# Patient Record
Sex: MALE | Race: OTHER
[De-identification: names, ages, dates, MRNs, and addresses within clinical notes are randomized per-mention and may not be internally consistent; named-entity substitution may affect disease eponyms.]

---

## 2023-02-09 ENCOUNTER — HOSPITAL ENCOUNTER (INPATIENT)
Dept: HOSPITAL 93 - ER | Age: 88
LOS: 3 days | Discharge: HOME | DRG: 683 | End: 2023-02-12
Attending: INTERNAL MEDICINE | Admitting: INTERNAL MEDICINE
Payer: COMMERCIAL

## 2023-02-09 VITALS — HEIGHT: 60 IN | WEIGHT: 122 LBS | BODY MASS INDEX: 23.95 KG/M2

## 2023-02-09 DIAGNOSIS — N18.9: ICD-10-CM

## 2023-02-09 DIAGNOSIS — E11.65: ICD-10-CM

## 2023-02-09 DIAGNOSIS — N17.8: Primary | ICD-10-CM

## 2023-02-09 DIAGNOSIS — Z20.822: ICD-10-CM

## 2023-02-09 DIAGNOSIS — I25.10: ICD-10-CM

## 2023-02-09 DIAGNOSIS — Z79.4: ICD-10-CM

## 2023-02-09 DIAGNOSIS — I13.0: ICD-10-CM

## 2023-02-09 DIAGNOSIS — E87.20: ICD-10-CM

## 2023-02-09 DIAGNOSIS — E78.5: ICD-10-CM

## 2023-02-09 DIAGNOSIS — I50.9: ICD-10-CM

## 2023-02-09 DIAGNOSIS — K21.9: ICD-10-CM

## 2023-02-09 NOTE — NUR
SE RECIBE PACIENTE EN OTIS ALERTA Y ORIENTADO X3 .PTE CON VENOPUNCION
PATENTE Y KATHERIN DE EDEMA BAJANDO 0.9NSS @150ML. PTE PENDIENTE MICHAEL DXT.

## 2023-02-09 NOTE — NUR
SE LE ORIENTA A PACIENTE SOBRE LAS ORDENES MEDICAS, REFIERE ENTENDER LAS
MISMAS. SE CANALIZA Y SE LE COLOCA LOS IVF'S, SE LE ZO LAS MUETRAS Y SE LE
ADMINISTRAN LOS MEDICAMENTOS MATTHEW LAS ORDENES MEDICAS.

## 2023-02-10 PROCEDURE — 4A12X4Z MONITORING OF CARDIAC ELECTRICAL ACTIVITY, EXTERNAL APPROACH: ICD-10-PCS | Performed by: INTERNAL MEDICINE

## 2023-02-10 PROCEDURE — BW28ZZZ COMPUTERIZED TOMOGRAPHY (CT SCAN) OF HEAD: ICD-10-PCS | Performed by: INTERNAL MEDICINE

## 2023-02-13 ENCOUNTER — HOSPITAL ENCOUNTER (EMERGENCY)
Dept: HOSPITAL 93 - ER | Age: 88
Discharge: HOME | End: 2023-02-13
Payer: COMMERCIAL

## 2023-02-13 VITALS — BODY MASS INDEX: 20.83 KG/M2 | WEIGHT: 125 LBS | HEIGHT: 65 IN

## 2023-02-13 DIAGNOSIS — R41.0: Primary | ICD-10-CM

## 2023-02-13 DIAGNOSIS — Z20.822: ICD-10-CM
